# Patient Record
Sex: MALE | Race: WHITE | NOT HISPANIC OR LATINO | Employment: UNEMPLOYED | URBAN - METROPOLITAN AREA
[De-identification: names, ages, dates, MRNs, and addresses within clinical notes are randomized per-mention and may not be internally consistent; named-entity substitution may affect disease eponyms.]

---

## 2018-10-07 ENCOUNTER — HOSPITAL ENCOUNTER (EMERGENCY)
Facility: HOSPITAL | Age: 23
Discharge: HOME | End: 2018-10-08
Attending: EMERGENCY MEDICINE

## 2018-10-07 DIAGNOSIS — T40.2X1A OPIOID OVERDOSE, ACCIDENTAL OR UNINTENTIONAL, INITIAL ENCOUNTER: Primary | ICD-10-CM

## 2018-10-07 PROCEDURE — 96360 HYDRATION IV INFUSION INIT: CPT

## 2018-10-07 PROCEDURE — 25000003 PHARM REV CODE 250: Performed by: EMERGENCY MEDICINE

## 2018-10-07 PROCEDURE — 99284 EMERGENCY DEPT VISIT MOD MDM: CPT | Mod: 25

## 2018-10-07 RX ORDER — SODIUM CHLORIDE 9 MG/ML
1000 INJECTION, SOLUTION INTRAVENOUS
Status: COMPLETED | OUTPATIENT
Start: 2018-10-07 | End: 2018-10-07

## 2018-10-07 RX ORDER — NALOXONE HYDROCHLORIDE 4 MG/.1ML
SPRAY NASAL
Qty: 1 EACH | Refills: 11 | Status: SHIPPED | OUTPATIENT
Start: 2018-10-07

## 2018-10-07 RX ADMIN — SODIUM CHLORIDE 1000 ML: 0.9 INJECTION, SOLUTION INTRAVENOUS at 08:10

## 2018-10-08 VITALS
HEART RATE: 62 BPM | HEIGHT: 69 IN | SYSTOLIC BLOOD PRESSURE: 126 MMHG | OXYGEN SATURATION: 95 % | TEMPERATURE: 98 F | BODY MASS INDEX: 19.85 KG/M2 | DIASTOLIC BLOOD PRESSURE: 87 MMHG | RESPIRATION RATE: 17 BRPM | WEIGHT: 134 LBS

## 2018-10-08 NOTE — DISCHARGE INSTRUCTIONS
Avoid substance use.  Visit 1 of the centers on the printed resources provided or [facility] if you would like help with your substance use.  Fill the prescription for Narcan provided with an educate your friends and family to use it if you overdose again.  Make an appointment to see a primary care physician for general checkup.  Return to the emergency room for fevers chest pain breathing difficulty or any new, worsening or concerning symptoms.

## 2018-10-08 NOTE — ED TRIAGE NOTES
"Pt presents to ER  Via EMS with c/o drug overdose. Pt was found unresponsive by friend. Pt has hx of heroin abuse. Pt was given 4mg of Narcan altogether. Pt currently alert and oriented x 4, calm, and cooperative.   Past Medical History:   Diagnosis Date    ADHD (attention deficit hyperactivity disorder)     Asthma     Seizure 10/4/2014   No past surgical history on file.   Vitals:    10/07/18 1932   BP: (!) 155/93   Pulse: 99   Resp: 15   Temp: 98 °F (36.7 °C)   TempSrc: Oral   SpO2: 100%   Weight: 60.8 kg (134 lb)   Height: 5' 9" (1.753 m)     "

## 2018-10-08 NOTE — ED PROVIDER NOTES
Encounter Date: 10/7/2018    SCRIBE #1 NOTE: I, Sarah Campbell, am scribing for, and in the presence of,  Oz Garcia MD. I have scribed the following portions of the note - Other sections scribed: HPI, ROS, PE, MDM.       History     Chief Complaint   Patient presents with    Drug Overdose     found unresponsive by friend, hx of heroin abuse, upon EMS arrival, pt agonal breathing,  EMS began bagging and gave 2mg narcan with no response, then additional 2mg narcan with a positive response, pt arrives to ED awake and oriented      CC: Drug Overdose    24 y/o male with PMHx of ADHD, Asthma, and Seizure (10/4/2014)  presents to ED via EMS for emergent evaluation after drug overdose this evening.  EMS reports pt was found by a friend after overdoes.  They state he was unresponsive with agonal breathing.  EMS reports giving 2 Narcan with no response and then 2 additional doses with a positive response.  They report pt arrived to this facility a/ox4.  At this facility, HPI is limited because pt is very drowsy and not answering questions.        The history is provided by the EMS personnel and the patient. No  was used.     Review of patient's allergies indicates:  No Known Allergies  Past Medical History:   Diagnosis Date    ADHD (attention deficit hyperactivity disorder)     Asthma     Seizure 10/4/2014     History reviewed. No pertinent surgical history.  Family History   Problem Relation Age of Onset    Asthma Father     Hearing loss Father     No Known Problems Mother      Social History     Tobacco Use    Smoking status: Current Every Day Smoker     Packs/day: 1.00     Years: 3.00     Pack years: 3.00    Smokeless tobacco: Never Used   Substance Use Topics    Alcohol use: Yes     Comment: social    Drug use: Yes     Types: Marijuana     Comment: [place]     Review of Systems   Unable to perform ROS: Mental status change       Physical Exam     Initial Vitals [10/07/18 1932]   BP Pulse  Resp Temp SpO2   (!) 155/93 99 15 98 °F (36.7 °C) 100 %      MAP       --         Physical Exam    Nursing note and vitals reviewed.  Constitutional: He is not diaphoretic. No distress.   Drowsy but arousable   HENT:   Head: Normocephalic and atraumatic.   Mouth/Throat: Oropharynx is clear and moist.   Eyes: Conjunctivae and EOM are normal. Pupils are equal, round, and reactive to light. No scleral icterus.   Neck: Normal range of motion. Neck supple. No JVD present.   Cardiovascular: Normal rate, regular rhythm and intact distal pulses.   Pulmonary/Chest: Breath sounds normal. No stridor. No respiratory distress.   Breathing spontaneously.  Lungs clear.   Abdominal: Soft. Bowel sounds are normal. He exhibits no distension. There is no tenderness.   Musculoskeletal: Normal range of motion. He exhibits no edema or tenderness.   Neurological: He has normal strength. No cranial nerve deficit.   Drowsy but easily arousable.   Skin: Skin is warm and dry. No ecchymosis and no rash noted.   No hematoma.  Dry blood to R external ear.  Abrasion to L temple.   Psychiatric: He has a normal mood and affect.         ED Course   Procedures  Labs Reviewed - No data to display       Imaging Results          CT Head Without Contrast (Final result)  Result time 10/07/18 21:04:27    Final result by Vlad Cristina MD (10/07/18 21:04:27)                 Impression:      No acute intracranial process.      Electronically signed by: Vlad Cristina MD  Date:    10/07/2018  Time:    21:04             Narrative:    EXAMINATION:  CT HEAD WITHOUT CONTRAST    CLINICAL HISTORY:  Confusion/delirium, altered LOC, unexplained;    TECHNIQUE:  Low dose axial images were obtained through the head.  Coronal and sagittal reformations were also performed. Contrast was not administered.    COMPARISON:  10/03/2014.    FINDINGS:  The subcutaneous tissues are within normal limits.  The bony calvarium is intact.  The paranasal sinuses and mastoid air cells are  clear.  The orbits and intraorbital contents are within normal limits.    The craniocervical junction is unremarkable.  The midline structures are unremarkable.  There are no extra-axial fluid collections.  There is no evidence of intracranial hemorrhage.  The ventricles and sulci are within normal limits.  The cisterns are unremarkable.  The gray-white differentiation is maintained.  There is no dense vessel sign.  There is no evidence of mass effect.                              X-Rays:   Independently Interpreted Readings:   Head CT: No skull fracture.  No acute stroke.  No hemorrhage.     Medical Decision Making:   History:   Old Medical Records: I decided to obtain old medical records.  Old Records Summarized: records from previous admission(s).       <> Summary of Records: Past ED visits for intoxication.  Differential Diagnosis:   Opioid overdose  Intoxication  Intracranial injury  Independently Interpreted Test(s):   I have ordered and independently interpreted X-rays - see prior notes.  Clinical Tests:   Radiological Study: Ordered and Reviewed  ED Management:  Patient drowsy but easily arousable at time history and physical.  He is breathing spontaneously and has normal respiratory effort.  Sent for CT scan of the head given abrasion and dried blood on external ear.  There is no hemotympanum.  CT of the brain is negative for acute intracranial injury. Patient observed in the ED for several hours without recurrence respiratory depression and, apnea or hypotension.  Patient on reassessment is alert and oriented. He tolerated p.o..  He is hemodynamically stable and fit for discharge to follow up with primary physician.  And substance abuse counselor.  This chart completed using dictation software, as a result there may be some typographical errors.             Scribe Attestation:   Scribe #1: I performed the above scribed service and the documentation accurately describes the services I performed. I attest to  the accuracy of the note.    Attending Attestation:           Physician Attestation for Scribe:  Physician Attestation Statement for Scribe #1: I, Oz Garcia MD, reviewed documentation, as scribed by Sarah Campbell in my presence, and it is both accurate and complete.                    Clinical Impression:   The encounter diagnosis was Opioid overdose, accidental or unintentional, initial encounter.      Disposition:   Disposition: Discharged  Condition: Stable                        Oz Garcia MD  10/07/18 [id number]

## 2018-10-08 NOTE — ED NOTES
Attempted to call pt's mother Bethanie Andersonanny regarding pt's ride home. No answer. Pt's mother's number is [phone]